# Patient Record
Sex: FEMALE | NOT HISPANIC OR LATINO | Employment: OTHER | URBAN - METROPOLITAN AREA
[De-identification: names, ages, dates, MRNs, and addresses within clinical notes are randomized per-mention and may not be internally consistent; named-entity substitution may affect disease eponyms.]

---

## 2017-10-17 ENCOUNTER — APPOINTMENT (OUTPATIENT)
Dept: PHYSICAL THERAPY | Facility: REHABILITATION | Age: 72
End: 2017-10-17
Payer: MEDICARE

## 2017-10-17 PROCEDURE — 97110 THERAPEUTIC EXERCISES: CPT

## 2017-10-17 PROCEDURE — G8991 OTHER PT/OT GOAL STATUS: HCPCS

## 2017-10-17 PROCEDURE — 97161 PT EVAL LOW COMPLEX 20 MIN: CPT

## 2017-10-17 PROCEDURE — G8990 OTHER PT/OT CURRENT STATUS: HCPCS

## 2017-10-24 ENCOUNTER — APPOINTMENT (OUTPATIENT)
Dept: PHYSICAL THERAPY | Facility: REHABILITATION | Age: 72
End: 2017-10-24
Payer: MEDICARE

## 2017-10-24 PROCEDURE — 97140 MANUAL THERAPY 1/> REGIONS: CPT

## 2017-10-24 PROCEDURE — 97110 THERAPEUTIC EXERCISES: CPT

## 2017-11-09 ENCOUNTER — APPOINTMENT (OUTPATIENT)
Dept: PHYSICAL THERAPY | Facility: REHABILITATION | Age: 72
End: 2017-11-09
Payer: MEDICARE

## 2017-11-09 PROCEDURE — 97110 THERAPEUTIC EXERCISES: CPT

## 2017-11-09 PROCEDURE — G8991 OTHER PT/OT GOAL STATUS: HCPCS

## 2017-11-09 PROCEDURE — G8990 OTHER PT/OT CURRENT STATUS: HCPCS

## 2017-11-09 PROCEDURE — G8992 OTHER PT/OT  D/C STATUS: HCPCS

## 2017-11-27 ENCOUNTER — APPOINTMENT (OUTPATIENT)
Dept: PHYSICAL THERAPY | Facility: REHABILITATION | Age: 72
End: 2017-11-27
Payer: MEDICARE

## 2017-11-27 PROCEDURE — G8991 OTHER PT/OT GOAL STATUS: HCPCS

## 2017-11-27 PROCEDURE — 97162 PT EVAL MOD COMPLEX 30 MIN: CPT

## 2017-11-27 PROCEDURE — G8990 OTHER PT/OT CURRENT STATUS: HCPCS

## 2017-12-01 ENCOUNTER — APPOINTMENT (OUTPATIENT)
Dept: PHYSICAL THERAPY | Facility: REHABILITATION | Age: 72
End: 2017-12-01
Payer: MEDICARE

## 2017-12-01 PROCEDURE — 97110 THERAPEUTIC EXERCISES: CPT

## 2017-12-01 PROCEDURE — 97112 NEUROMUSCULAR REEDUCATION: CPT

## 2017-12-01 PROCEDURE — 97140 MANUAL THERAPY 1/> REGIONS: CPT

## 2017-12-08 ENCOUNTER — APPOINTMENT (OUTPATIENT)
Dept: PHYSICAL THERAPY | Facility: REHABILITATION | Age: 72
End: 2017-12-08
Payer: MEDICARE

## 2017-12-08 PROCEDURE — 97112 NEUROMUSCULAR REEDUCATION: CPT

## 2017-12-08 PROCEDURE — 97110 THERAPEUTIC EXERCISES: CPT

## 2017-12-08 PROCEDURE — 97140 MANUAL THERAPY 1/> REGIONS: CPT

## 2017-12-13 ENCOUNTER — APPOINTMENT (OUTPATIENT)
Dept: PHYSICAL THERAPY | Facility: REHABILITATION | Age: 72
End: 2017-12-13
Payer: MEDICARE

## 2017-12-13 PROCEDURE — 97112 NEUROMUSCULAR REEDUCATION: CPT

## 2017-12-13 PROCEDURE — 97110 THERAPEUTIC EXERCISES: CPT

## 2017-12-19 ENCOUNTER — APPOINTMENT (OUTPATIENT)
Dept: PHYSICAL THERAPY | Facility: REHABILITATION | Age: 72
End: 2017-12-19
Payer: MEDICARE

## 2017-12-19 PROCEDURE — 97112 NEUROMUSCULAR REEDUCATION: CPT

## 2017-12-19 PROCEDURE — G8992 OTHER PT/OT  D/C STATUS: HCPCS

## 2017-12-19 PROCEDURE — 97140 MANUAL THERAPY 1/> REGIONS: CPT

## 2017-12-19 PROCEDURE — 97110 THERAPEUTIC EXERCISES: CPT

## 2017-12-19 PROCEDURE — G8990 OTHER PT/OT CURRENT STATUS: HCPCS

## 2017-12-19 PROCEDURE — G8991 OTHER PT/OT GOAL STATUS: HCPCS

## 2021-11-05 ENCOUNTER — APPOINTMENT (RX ONLY)
Dept: URBAN - METROPOLITAN AREA CLINIC 156 | Facility: CLINIC | Age: 76
Setting detail: DERMATOLOGY
End: 2021-11-05

## 2021-11-05 VITALS
HEIGHT: 67 IN | DIASTOLIC BLOOD PRESSURE: 83 MMHG | SYSTOLIC BLOOD PRESSURE: 150 MMHG | WEIGHT: 176 LBS | RESPIRATION RATE: 16 BRPM | HEART RATE: 71 BPM

## 2021-11-05 DIAGNOSIS — L82.1 OTHER SEBORRHEIC KERATOSIS: ICD-10-CM

## 2021-11-05 DIAGNOSIS — L57.8 OTHER SKIN CHANGES DUE TO CHRONIC EXPOSURE TO NONIONIZING RADIATION: ICD-10-CM

## 2021-11-05 DIAGNOSIS — H61.03 CHONDRITIS OF EXTERNAL EAR: ICD-10-CM

## 2021-11-05 PROBLEM — D48.5 NEOPLASM OF UNCERTAIN BEHAVIOR OF SKIN: Status: ACTIVE | Noted: 2021-11-05

## 2021-11-05 PROCEDURE — ? SEPARATE AND IDENTIFIABLE DOCUMENTATION

## 2021-11-05 PROCEDURE — 99203 OFFICE O/P NEW LOW 30 MIN: CPT | Mod: 25

## 2021-11-05 PROCEDURE — ? BIOPSY BY SHAVE METHOD

## 2021-11-05 PROCEDURE — ? CONSULTATION FOR MOHS SURGERY

## 2021-11-05 PROCEDURE — 69100 BIOPSY OF EXTERNAL EAR: CPT

## 2021-11-05 PROCEDURE — ? COUNSELING

## 2021-11-05 ASSESSMENT — LOCATION DETAILED DESCRIPTION DERM
LOCATION DETAILED: LEFT SUPERIOR HELIX
LOCATION DETAILED: LEFT INFERIOR MEDIAL FOREHEAD
LOCATION DETAILED: RIGHT INFERIOR ANTERIOR NECK

## 2021-11-05 ASSESSMENT — LOCATION SIMPLE DESCRIPTION DERM
LOCATION SIMPLE: LEFT FOREHEAD
LOCATION SIMPLE: LEFT EAR
LOCATION SIMPLE: RIGHT ANTERIOR NECK

## 2021-11-05 ASSESSMENT — LOCATION ZONE DERM
LOCATION ZONE: EAR
LOCATION ZONE: NECK
LOCATION ZONE: FACE

## 2024-08-23 ENCOUNTER — EVALUATION (OUTPATIENT)
Dept: PHYSICAL THERAPY | Facility: CLINIC | Age: 79
End: 2024-08-23
Payer: COMMERCIAL

## 2024-08-23 VITALS — DIASTOLIC BLOOD PRESSURE: 82 MMHG | SYSTOLIC BLOOD PRESSURE: 133 MMHG

## 2024-08-23 DIAGNOSIS — M75.51 BURSITIS OF RIGHT SHOULDER: Primary | ICD-10-CM

## 2024-08-23 PROCEDURE — 97140 MANUAL THERAPY 1/> REGIONS: CPT

## 2024-08-23 PROCEDURE — 97112 NEUROMUSCULAR REEDUCATION: CPT

## 2024-08-23 PROCEDURE — 97162 PT EVAL MOD COMPLEX 30 MIN: CPT

## 2024-08-23 NOTE — LETTER
2024    [facility]  [address]  [place] [address]    Patient: Lexi Pickard   YOB: 1945   Date of Visit: 2024     Encounter Diagnosis     ICD-10-CM    1. Bursitis of right shoulder  M75.51           Dear Dr. Bray:    Thank you for your recent referral of Lexi Pickard. Please review the attached evaluation summary from Lexi's recent visit.     Please verify that you agree with the plan of care by signing the attached order.     If you have any questions or concerns, please do not hesitate to call.     I sincerely appreciate the opportunity to share in the care of one of your patients and hope to have another opportunity to work with you in the near future.       Sincerely,    Kelechi Vera      Referring Provider:      I certify that I have read the below Plan of Care and certify the need for these services furnished under this plan of treatment while under my care.                    [address]  [place] [address]  Via Fax: [phone]          PT Evaluation     Today's date: 2024  Patient name: Lexi Pickard  : 1945  MRN: [id number]  Referring provider: [facility]*  Dx:   Encounter Diagnosis     ICD-10-CM    1. Bursitis of right shoulder  M75.51           Start Time: 735  Stop Time: 830  Total time in clinic (min): 55 minutes    Assessment  Impairments: abnormal coordination, abnormal muscle firing, abnormal muscle tone, abnormal or restricted ROM, abnormal movement, activity intolerance, impaired physical strength, lacks appropriate home exercise program, pain with function, poor posture , unable to perform ADL, participation limitations, activity limitations and endurance  Symptom irritability: moderate    Assessment details: Pt is a 79 y.o. year old female presenting to physical therapy for bursitis of right shoulder. She presents with the following impairments: limited R  shoulder ROM in all directions, pain with R shoulder movement, UE weakness, AC joint, bicep tendon, and RC muscle belly TTP, (+) Neers, Lipscomb Lucho, speeds test, Yergason, and empty can tests affecting their function with overhead activities, lifting objects, vacuuming, swimming, and completing ADLs. Bicep tendon involvement and impingement likely given location of symptoms and (+) special tests. Pt will benefit from skilled physical therapy to address functional limitations noted in evaluation and meet patient goals.    Evaluation completed by SPT Gerhard Vera under supervision from Cr Reynaga DPT.           Understanding of Dx/Px/POC: good     Prognosis: good    Goals  STG (2-4 wk)  1. Patient will demonstrate independence with HEP  2. Patient will improve GH flexion ROM to WFL to improve ability to reach overhead  3. Patient will improve shoulder abd strength to 4+/5 to improve tolerance with lifting objects  LTG (4-6 wks)  4. Patient will improve pain to 4/10 at worst to improve activity tolerance  1. Patient will meet expected FOTO score  2. Patient will be able to swim for 15 minutes without increase in pain  3. Patient will be able to put 10 dishes on high shelf without increase in pain    Plan  Patient would benefit from: skilled physical therapy  Planned modality interventions: thermotherapy: hydrocollator packs and cryotherapy    Planned therapy interventions: home exercise program, graded motor, graded exercise, functional ROM exercises, flexibility, joint mobilization, manual therapy, motor coordination training, neuromuscular re-education, patient/caregiver education, postural training, strengthening, stretching, therapeutic exercise and therapeutic activities    Frequency: 1-2x week  Duration in weeks: 4      Subjective Evaluation    History of Present Illness  Mechanism of injury: Patient reports intense shoulder pain local to the shoulder joint starting a couple of weeks ago when she went to  "get something out of cabinet. Patient reports that her pain has subsided since then to 5/10. Patient reports swimming a lot and couldn't get arm around post injury. Pt reports it felt like \"somebody tried to rip my shoulder off\". Massaging, heat, biofreeze and rest help relieve pain. Patient reports difficulty with reaching overhead and vacuuming biofreeze. Patient denies falls, denies hx of shoulder injury.   Quality of life: good    Pain  Current pain ratin  At best pain ratin  At worst pain rating: 10  Quality: dull ache, pressure and sharp  Relieving factors: relaxation, rest and heat  Aggravating factors: lifting and overhead activity        Objective     Postural Observations  Seated posture: poor  Standing posture: fair      Palpation   Left   No palpable tenderness to the anterior deltoid, biceps, infraspinatus, latissimus, levator scapulae, lower trapezius, middle deltoid, middle trapezius, posterior deltoid, supraspinatus, teres major, teres minor, triceps and upper trapezius.     Right   No palpable tenderness to the anterior deltoid, infraspinatus, latissimus, levator scapulae, lower trapezius, middle deltoid, middle trapezius, posterior deltoid, triceps and upper trapezius.   Tenderness of the biceps, infraspinatus, supraspinatus, teres major and teres minor.     Right Shoulder Comments  Right biceps: tenderness of bicep tendon.     Tenderness     Left Shoulder   No tenderness in the AC joint, bicipital groove, clavicle, infraspinatus tendon, lateral scapula, medial scapula, scapular spine, subacromial bursa and supraspinatus tendon.     Right Shoulder  Tenderness in the AC joint, bicipital groove and subacromial bursa. No tenderness in the clavicle, infraspinatus tendon, lateral scapula, medial scapula, scapular spine and supraspinatus tendon.     Active Range of Motion   Left Shoulder   Flexion: WFL  Abduction: WFL  Internal rotation BTB: T4     Right Shoulder   Flexion: 120 degrees with " "pain  Abduction: 87 degrees with pain  Internal rotation BTB: sacrum     Passive Range of Motion     Right Shoulder   Flexion: 120 degrees with pain  Abduction: 90 degrees with pain  External rotation 45°: 60 degrees with pain  Internal rotation 0°: 25 degrees with pain    Scapular Mobility     Right Shoulder   Scapular mobility: good    Joint Play     Right Shoulder  Hypomobile in the anterior capsule and posterior capsule.     Strength/Myotome Testing     Left Shoulder     Planes of Motion   Flexion: 3+   Abduction: 4-   External rotation at 0°: 4   Internal rotation at 0°: 4+     Isolated Muscles   Biceps: 4+     Right Shoulder     Planes of Motion   Flexion: 3-   Abduction: 3-   External rotation at 0°: 3+   Internal rotation at 0°: 4-     Isolated Muscles   Biceps: 4-     Tests     Right Shoulder   Positive drop arm, empty can, Hawkin's, Neer's, painful arc, Speed's and Yergason's.   Negative full can.              Precautions: N/A    HEP:  Access Code [email or web address]     Manuals 8/23            Posterior and inferior GH mobs  ND Gr II                                                   Neuro Re-Ed 8/23            ER iso 10x5\"            Table slides 10x10\" flex and abd            No moneys 10x GTB            Scap squeezes nv            Resisted ER/IR             Rows/ext                          Ther Ex 8/23            Pulleys             UBE             Bicep curl 15x GTB            AAROM flex/abd/ER nv                                                                                          Ther Activity             Objects on shelf                          Gait Training                                       Modalities                                                             "

## 2024-08-23 NOTE — PROGRESS NOTES
PT Evaluation     Today's date: 2024  Patient name: Lexi Pickard  : 1945  MRN: [id number]  Referring provider: [facility]*  Dx:   Encounter Diagnosis     ICD-10-CM    1. Bursitis of right shoulder  M75.51           Start Time: 735  Stop Time: 830  Total time in clinic (min): 55 minutes    Assessment  Impairments: abnormal coordination, abnormal muscle firing, abnormal muscle tone, abnormal or restricted ROM, abnormal movement, activity intolerance, impaired physical strength, lacks appropriate home exercise program, pain with function, poor posture , unable to perform ADL, participation limitations, activity limitations and endurance  Symptom irritability: moderate    Assessment details: Pt is a 79 y.o. year old female presenting to physical therapy for bursitis of right shoulder. She presents with the following impairments: limited R shoulder ROM in all directions, pain with R shoulder movement, UE weakness, AC joint, bicep tendon, and RC muscle belly TTP, (+) Neers, Lipscomb Lucho, speeds test, Yergason, and empty can tests affecting their function with overhead activities, lifting objects, vacuuming, swimming, and completing ADLs. Bicep tendon involvement and impingement likely given location of symptoms and (+) special tests. Pt will benefit from skilled physical therapy to address functional limitations noted in evaluation and meet patient goals.    Evaluation completed by OCTAVIA Vera under supervision from Cr Reynaga DPT.           Understanding of Dx/Px/POC: good     Prognosis: good    Goals  STG (2-4 wk)  1. Patient will demonstrate independence with HEP  2. Patient will improve GH flexion ROM to WFL to improve ability to reach overhead  3. Patient will improve shoulder abd strength to 4+/5 to improve tolerance with lifting objects  LTG (4-6 wks)  4. Patient will improve pain to 4/10 at worst to improve activity tolerance  1. Patient will meet expected FOTO score  2.  "Patient will be able to swim for 15 minutes without increase in pain  3. Patient will be able to put 10 dishes on high shelf without increase in pain    Plan  Patient would benefit from: skilled physical therapy  Planned modality interventions: thermotherapy: hydrocollator packs and cryotherapy    Planned therapy interventions: home exercise program, graded motor, graded exercise, functional ROM exercises, flexibility, joint mobilization, manual therapy, motor coordination training, neuromuscular re-education, patient/caregiver education, postural training, strengthening, stretching, therapeutic exercise and therapeutic activities    Frequency: 1-2x week  Duration in weeks: 4      Subjective Evaluation    History of Present Illness  Mechanism of injury: Patient reports intense shoulder pain local to the shoulder joint starting a couple of weeks ago when she went to get something out of cabinet. Patient reports that her pain has subsided since then to 5/10. Patient reports swimming a lot and couldn't get arm around post injury. Pt reports it felt like \"somebody tried to rip my shoulder off\". Massaging, heat, biofreeze and rest help relieve pain. Patient reports difficulty with reaching overhead and vacuuming biofreeze. Patient denies falls, denies hx of shoulder injury.   Quality of life: good    Pain  Current pain ratin  At best pain ratin  At worst pain rating: 10  Quality: dull ache, pressure and sharp  Relieving factors: relaxation, rest and heat  Aggravating factors: lifting and overhead activity        Objective     Postural Observations  Seated posture: poor  Standing posture: fair      Palpation   Left   No palpable tenderness to the anterior deltoid, biceps, infraspinatus, latissimus, levator scapulae, lower trapezius, middle deltoid, middle trapezius, posterior deltoid, supraspinatus, teres major, teres minor, triceps and upper trapezius.     Right   No palpable tenderness to the anterior deltoid, " infraspinatus, latissimus, levator scapulae, lower trapezius, middle deltoid, middle trapezius, posterior deltoid, triceps and upper trapezius.   Tenderness of the biceps, infraspinatus, supraspinatus, teres major and teres minor.     Right Shoulder Comments  Right biceps: tenderness of bicep tendon.     Tenderness     Left Shoulder   No tenderness in the AC joint, bicipital groove, clavicle, infraspinatus tendon, lateral scapula, medial scapula, scapular spine, subacromial bursa and supraspinatus tendon.     Right Shoulder  Tenderness in the AC joint, bicipital groove and subacromial bursa. No tenderness in the clavicle, infraspinatus tendon, lateral scapula, medial scapula, scapular spine and supraspinatus tendon.     Active Range of Motion   Left Shoulder   Flexion: WFL  Abduction: WFL  Internal rotation BTB: T4     Right Shoulder   Flexion: 120 degrees with pain  Abduction: 87 degrees with pain  Internal rotation BTB: sacrum     Passive Range of Motion     Right Shoulder   Flexion: 120 degrees with pain  Abduction: 90 degrees with pain  External rotation 45°: 60 degrees with pain  Internal rotation 0°: 25 degrees with pain    Scapular Mobility     Right Shoulder   Scapular mobility: good    Joint Play     Right Shoulder  Hypomobile in the anterior capsule and posterior capsule.     Strength/Myotome Testing     Left Shoulder     Planes of Motion   Flexion: 3+   Abduction: 4-   External rotation at 0°: 4   Internal rotation at 0°: 4+     Isolated Muscles   Biceps: 4+     Right Shoulder     Planes of Motion   Flexion: 3-   Abduction: 3-   External rotation at 0°: 3+   Internal rotation at 0°: 4-     Isolated Muscles   Biceps: 4-     Tests     Right Shoulder   Positive drop arm, empty can, Hawkin's, Neer's, painful arc, Speed's and Yergason's.   Negative full can.              Precautions: N/A    HEP:  Access Code [email or web address]     Manuals 8/23            Posterior and inferior GH mobs  ND Gr II                            "                        Neuro Re-Ed 8/23            ER iso 10x5\"            Table slides 10x10\" flex and abd            No moneys 10x GTB            Scap squeezes nv            Resisted ER/IR             Rows/ext                          Ther Ex 8/23            Pulleys             UBE             Bicep curl 15x GTB            AAROM flex/abd/ER nv                                                                                          Ther Activity             Objects on shelf                          Gait Training                                       Modalities                                            "

## 2024-08-28 ENCOUNTER — OFFICE VISIT (OUTPATIENT)
Dept: PHYSICAL THERAPY | Facility: CLINIC | Age: 79
End: 2024-08-28
Payer: COMMERCIAL

## 2024-08-28 DIAGNOSIS — M75.51 BURSITIS OF RIGHT SHOULDER: Primary | ICD-10-CM

## 2024-08-28 PROCEDURE — 97140 MANUAL THERAPY 1/> REGIONS: CPT | Performed by: PHYSICAL THERAPIST

## 2024-08-28 PROCEDURE — 97112 NEUROMUSCULAR REEDUCATION: CPT | Performed by: PHYSICAL THERAPIST

## 2024-08-28 PROCEDURE — 97110 THERAPEUTIC EXERCISES: CPT | Performed by: PHYSICAL THERAPIST

## 2024-08-28 NOTE — PROGRESS NOTES
"Daily Note     Today's date: 2024  Patient name: Lexi Pickard  : 1945  MRN: [id number]  Referring provider: [facility]*  Dx:   Encounter Diagnosis     ICD-10-CM    1. Bursitis of right shoulder  M75.51           Start Time: 830  Stop Time: 912  Total time in clinic (min): 42 minutes    Subjective: Pt presents to PT reporting decreased pain compared to initial eval. She reports sleeping in position that would have normally caused pain.      Objective: See treatment diary below      Assessment: Patient started abd iso today and reports moderate fatigue, pt required minimal VC to reduce IR during exercise, able to maintain proper form post VC. Pt experienced increased pain and limited ROM with scaption on pulleys, PROM, and AAROM with esperanza, pt did not report intolerable increase in pain and reports some stretching with pain. Pt demonstrates good tolerance to progressions. Patient demonstrated fatigue post treatment, exhibited good technique with therapeutic exercises, and would benefit from continued PT to increase flexibility, strength and function.      Plan: Continue per plan of care.     Session completed by OCTAVIA Vera under supervision from Chris Chatterjee DPT.      Precautions: N/A    HEP: Access Code [email or web address]     Manuals            Posterior and inferior GH mobs  ND Gr II ND Gr II           Shoulder PROM  ND flex and abd                                     Neuro Re-Ed            ER iso 10x5\" 10x5\"           Abd iso  10x5\"           Table slides 10x10\" flex and abd              No moneys 10x GTB 2x10 OTB           Scap squeezes nv 10x5\"           Resisted ER/IR               Rows/ext                          Ther Ex            Pulleys  3'/3'           UBE             Bicep curl 15x GTB            AAROM flex/abd/ER nv 10x5\" flex and abd 1# esperanza                                                                                         Ther Activity           "   Objects on shelf                          Gait Training  8/28                                       Modalities  8/28

## 2024-08-30 ENCOUNTER — OFFICE VISIT (OUTPATIENT)
Dept: PHYSICAL THERAPY | Facility: CLINIC | Age: 79
End: 2024-08-30
Payer: COMMERCIAL

## 2024-08-30 DIAGNOSIS — M75.51 BURSITIS OF RIGHT SHOULDER: Primary | ICD-10-CM

## 2024-08-30 PROCEDURE — 97112 NEUROMUSCULAR REEDUCATION: CPT

## 2024-08-30 PROCEDURE — 97110 THERAPEUTIC EXERCISES: CPT

## 2024-08-30 PROCEDURE — 97140 MANUAL THERAPY 1/> REGIONS: CPT

## 2024-08-30 NOTE — PROGRESS NOTES
"Daily Note     Today's date: 2024  Patient name: Lexi Pickard  : 1945  MRN: [id number]  [facility]  Dx:   Encounter Diagnosis     ICD-10-CM    1. Bursitis of right shoulder  M75.51           Start Time: 730  Stop Time: 815  Total time in clinic (min): 45 minutes    Subjective: Pt reports that her shoulder is feeling better overall coming into today's session, noting she is able to use her arm for functional movements with minimal pain noted.      Objective: See treatment diary below      Assessment: Pt tolerated treatment well. Added resisted IR/ER, theraband resisted rows and extensions, and shoulder shrugs during today's session to improve the strength and endurance of shoulder, rotator cuff, scapular stabilization, and postural musculature. Pt needed moderate cueing for form corrections on the new exercises, but once pt was cued she was able to complete all remaining sets and reps with proper form and appropriatte levels of fatigue post session. Added finger ladder to improve functional tolerance and active ROM in both flexion and extension movements. Patient exhibited good technique with therapeutic exercises and would benefit from continued PT      Plan: Continue per plan of care.  Progress treatment as tolerated.       Precautions: N/A    HEP: Access Code [email or web address]     Manuals           Posterior and inferior GH mobs  ND Gr II ND Gr II PWK Gr II          Shoulder PROM  ND flex and abd PWK                                    Neuro Re-Ed           ER iso 10x5\" 10x5\"           Abd iso  10x5\"           Table slides 10x10\" flex and abd              No moneys 10x GTB 2x10 OTB 20x PTB          Scap squeezes nv 10x5\" 20x 3\" 2#          Resisted ER/IR   20x GTB            Rows/ext   3x10 GTB                       Ther Ex           Pulleys  3'/3' 3'/3'          UBE             Bicep curl 15x GTB            AAROM flex/abd/ER nv 10x5\" " "flex and abd 1# esperanza 10x5\" flex/abd/er 1# bar          Shoulder Shrugs   20x 2#          Finger Ladder    5x5\" flex/abd                                                              Ther Activity  8/28           Objects on shelf                          Gait Training  8/28                                       Modalities  8/28                                            "

## 2024-09-04 ENCOUNTER — OFFICE VISIT (OUTPATIENT)
Dept: PHYSICAL THERAPY | Facility: CLINIC | Age: 79
End: 2024-09-04
Payer: COMMERCIAL

## 2024-09-04 DIAGNOSIS — M75.51 BURSITIS OF RIGHT SHOULDER: Primary | ICD-10-CM

## 2024-09-04 PROCEDURE — 97112 NEUROMUSCULAR REEDUCATION: CPT

## 2024-09-04 PROCEDURE — 97110 THERAPEUTIC EXERCISES: CPT

## 2024-09-04 PROCEDURE — 97140 MANUAL THERAPY 1/> REGIONS: CPT

## 2024-09-04 NOTE — PROGRESS NOTES
"Daily Note     Today's date: 2024  Patient name: Lexi Pikcard  : 1945  MRN: [id number]  [facility]  Dx:   Encounter Diagnosis     ICD-10-CM    1. Bursitis of right shoulder  M75.51           Start Time: 1230  Stop Time: 1310  Total time in clinic (min): 40 minutes    Subjective: Pt reports that she is having pain in her right upper trap/cerivcal spine coming into today's session.       Objective: See treatment diary below      Assessment: Pt tolerated treatment well. Performed STM to cervical spine and UT/LS region to attempt to reduce tension in associated musculature and decrease overall symptom intensity. Added upper trap stretch, levator scap stretch, and cervical SNAGs during today's session for further symptom reduction, improvements in cervical mobility, decreased tension in musculature. Pt needed moderate cueing for form corrections on both new and previously performed exercises, but once pt was cued she was able to complete all remaining sets and reps with proper form and appropriate levels of fatigue post session. Patient exhibited good technique with therapeutic exercises and would benefit from continued PT      Plan: Continue per plan of care.  Progress treatment as tolerated.       Precautions: N/A, CIPD    HEP: Access Code [email or web address]     Manuals  9         Posterior and inferior GH mobs  ND Gr II ND Gr II PWK Gr II PWK Gr II         Shoulder PROM  ND flex and abd PWK PWK         T-C Spine STM    PWK                      Neuro Re-Ed  9/4         ER iso 10x5\" 10x5\"           Abd iso  10x5\"           Table slides 10x10\" flex and abd              No moneys 10x GTB 2x10 OTB 20x PTB          Scap squeezes nv 10x5\" 20x 3\" 2#          Resisted ER/IR   20x GTB            Rows/ext   3x10 GTB 30x ea GTB         BB Towel Str    10x3\" R          Ther Ex  94         Pulleys  3'/3' 3'/3' 3'/3'         UBE             Bicep curl " "15x GTB            AAROM flex/abd/ER nv 10x5\" flex and abd 1# esperanza 10x5\" flex/abd/er 1# bar 10x5\" flex/abd/ ER 2# bar         Shoulder Shrugs   20x 2#          Finger Ladder    5x5\" flex/abd          UT str    3x30\" R         LS str    3x30\" R         Cervical SNAGs    10x3\"  ea                      Ther Activity  8/28           Objects on shelf                          Gait Training  8/28                                       Modalities  8/28                                              "

## 2024-09-11 ENCOUNTER — OFFICE VISIT (OUTPATIENT)
Dept: PHYSICAL THERAPY | Facility: CLINIC | Age: 79
End: 2024-09-11
Payer: COMMERCIAL

## 2024-09-11 DIAGNOSIS — M75.51 BURSITIS OF RIGHT SHOULDER: Primary | ICD-10-CM

## 2024-09-11 PROCEDURE — 97112 NEUROMUSCULAR REEDUCATION: CPT

## 2024-09-11 PROCEDURE — 97110 THERAPEUTIC EXERCISES: CPT

## 2024-09-11 NOTE — PROGRESS NOTES
"Daily Note     Today's date: 2024  Patient name: Lexi Pickard  : 1945  MRN: [id number]  Referring provider: [facility]*  Dx:   Encounter Diagnosis     ICD-10-CM    1. Bursitis of right shoulder  M75.51           Start Time: 1000  Stop Time: 1045  Total time in clinic (min): 45 minutes    Subjective: Pt reports that she is still having pain overall, but it had been both less intense and frequent since starting PT.       Objective: See treatment diary below      Assessment: Pt tolerated treatment well. Manual interventions were not performed during today's session due to PT being slightly under the weather and wanting to limit physical contact with the pt. Progressed rows/extension and resisted IR/ER from GTB to BTB. Progressed shoulder shrugs and scapular squeezes from 2# dumbbells to 3# dumbbells. Progressions were made to continue to improve the strength, endurance, and functional tolerance to activity to all shoulder, RTC, scapular stabilization, and postural musculature. Added openbooks to decreased tension in associated musculature and improve spinal mobility.  Patient exhibited good technique with therapeutic exercises and would benefit from continued PT      Plan: Continue per plan of care.  Progress treatment as tolerated.       Precautions: N/A, CIPD    HEP: Access Code [email or web address]     Manuals         Posterior and inferior GH mobs  ND Gr II ND Gr II PWK Gr II PWK Gr II NV        Shoulder PROM  ND flex and abd PWK PWK Nv        T-C Spine STM    PWK NV                     Neuro Re-Ed         ER iso 10x5\" 10x5\"           Abd iso  10x5\"           Table slides 10x10\" flex and abd              No moneys 10x GTB 2x10 OTB 20x PTB  25x GTB        Scap squeezes nv 10x5\" 20x 3\" 2#  20x3\" 3#        Resisted ER/IR   20x GTB  20x BTB          Rows/ext   3x10 GTB 30x ea GTB 30x BTB        BB Towel Str    10x3\" R          Ther Ex     " "    Pulleys  3'/3' 3'/3' 3'/3' 3'/3'        UBE             Bicep curl 15x GTB            AAROM flex/abd/ER nv 10x5\" flex and abd 1# esperanza 10x5\" flex/abd/er 1# bar 10x5\" flex/abd/ ER 2# bar 10x5\" flex/add/ ER 2# bar        Shoulder Shrugs   20x 2#  20x 3#        Finger Ladder    5x5\" flex/abd          UT str    3x30\" R 3x30\" R        LS str    3x30\" R 3x30\" R        Cervical SNAGs    10x3\"  ea 10x3\" ea        Open Books     10x5\"         Ther Activity  8/28           Objects on shelf                          Gait Training  8/28                                       Modalities  8/28                                                "

## 2024-09-18 ENCOUNTER — EVALUATION (OUTPATIENT)
Dept: PHYSICAL THERAPY | Facility: CLINIC | Age: 79
End: 2024-09-18
Payer: COMMERCIAL

## 2024-09-18 DIAGNOSIS — M75.51 BURSITIS OF RIGHT SHOULDER: Primary | ICD-10-CM

## 2024-09-18 PROCEDURE — 97110 THERAPEUTIC EXERCISES: CPT

## 2024-09-18 PROCEDURE — 97112 NEUROMUSCULAR REEDUCATION: CPT

## 2024-09-18 NOTE — PROGRESS NOTES
"Discharge Note     Today's date: 2024  Patient name: Lexi Pickard  : 1945  MRN: [id number]  Referring provider: Dashawn Bray-Barre Va*  Dx:   Encounter Diagnosis     ICD-10-CM    1. Bursitis of right shoulder  M75.51           Start Time: 1000  Stop Time: 1030  Total time in clinic (min): 30 minutes    Subjective: Pt reports that she is feeling better overall and would like today to be her discharge with updated HEP.      Objective: See treatment diary below      Assessment: Pt was educated on updated HEP that was given to her to continue to perform at home to prevent decline in progress made at physical therapy. Pt was able to teach back and demonstrate proper from with all exercise given in today's session. Patient will no longer benefit from skilled PT.        Plan: Discharge 2024     Precautions: N/A, CIPD    HEP: Access Code [email or web address]     Manuals        Posterior and inferior GH mobs  ND Gr II ND Gr II PWK Gr II PWK Gr II NV        Shoulder PROM  ND flex and abd PWK PWK Nv        T-C Spine STM    PWK NV                     Neuro Re-Ed        Pt education      PWK       ER iso 10x5\" 10x5\"           Abd iso  10x5\"           Table slides 10x10\" flex and abd              No moneys 10x GTB 2x10 OTB 20x PTB  25x GTB        Scap squeezes nv 10x5\" 20x 3\" 2#  20x3\" 3#        Resisted ER/IR   20x GTB  20x BTB          Rows/ext   3x10 GTB 30x ea GTB 30x BTB        BB Towel Str    10x3\" R          Ther Ex        Pulleys  3'/3' 3'/3' 3'/3' 3'/3'        UBE      3'/3'       HEP      PWK       Bicep curl 15x GTB            AAROM flex/abd/ER nv 10x5\" flex and abd 1# esperanza 10x5\" flex/abd/er 1# bar 10x5\" flex/abd/ ER 2# bar 10x5\" flex/add/ ER 2# bar        Shoulder Shrugs   20x 2#  20x 3#        Finger Ladder    5x5\" flex/abd          UT str    3x30\" R 3x30\" R        LS str    3x30\" R 3x30\" R        Cervical SNAGs    10x3\"  ea " "10x3\" ea        Open Books     10x5\"         Ther Activity  8/28           Objects on shelf                          Gait Training  8/28                                       Modalities  8/28                                                  "